# Patient Record
Sex: FEMALE | Race: WHITE
[De-identification: names, ages, dates, MRNs, and addresses within clinical notes are randomized per-mention and may not be internally consistent; named-entity substitution may affect disease eponyms.]

---

## 2017-01-12 ENCOUNTER — HOSPITAL ENCOUNTER (EMERGENCY)
Dept: HOSPITAL 75 - ER | Age: 57
Discharge: HOME | End: 2017-01-12
Payer: COMMERCIAL

## 2017-01-12 VITALS — WEIGHT: 140 LBS | HEIGHT: 65 IN | BODY MASS INDEX: 23.32 KG/M2

## 2017-01-12 VITALS — SYSTOLIC BLOOD PRESSURE: 113 MMHG | DIASTOLIC BLOOD PRESSURE: 63 MMHG

## 2017-01-12 DIAGNOSIS — E10.649: Primary | ICD-10-CM

## 2017-01-12 DIAGNOSIS — F17.210: ICD-10-CM

## 2017-01-12 LAB
ALBUMIN SERPL-MCNC: 4.6 G/DL (ref 3.2–4.5)
ALT SERPL-CCNC: 24 U/L (ref 0–55)
ANION GAP SERPL CALC-SCNC: 14 MMOL/L (ref 5–14)
APTT BLD: 27 SEC (ref 24–35)
AST SERPL-CCNC: 24 U/L (ref 5–34)
BASOPHILS # BLD AUTO: 0 10^3/UL (ref 0–0.1)
BASOPHILS NFR BLD AUTO: 1 % (ref 0–10)
BILIRUB SERPL-MCNC: 0.8 MG/DL (ref 0.1–1)
BILIRUB UR QL STRIP: NEGATIVE
BUN SERPL-MCNC: 7 MG/DL (ref 7–18)
BUN/CREAT SERPL: 10
CALCIUM SERPL-MCNC: 9.5 MG/DL (ref 8.5–10.1)
CHLORIDE SERPL-SCNC: 104 MMOL/L (ref 98–107)
CO2 SERPL-SCNC: 22 MMOL/L (ref 21–32)
CREAT SERPL-MCNC: 0.67 MG/DL (ref 0.6–1.3)
EOSINOPHIL # BLD AUTO: 0.1 10^3/UL (ref 0–0.3)
EOSINOPHIL NFR BLD AUTO: 2 % (ref 0–10)
ERYTHROCYTE [DISTWIDTH] IN BLOOD BY AUTOMATED COUNT: 13.3 % (ref 10–14.5)
GFR SERPLBLD BASED ON 1.73 SQ M-ARVRAT: > 60 ML/MIN
GLUCOSE SERPL-MCNC: 69 MG/DL (ref 70–105)
INR PPP: 1 (ref 0.8–1.4)
KETONES UR QL STRIP: (no result)
LEUKOCYTE ESTERASE UR QL STRIP: NEGATIVE
LYMPHOCYTES # BLD AUTO: 1.3 X 10^3 (ref 1–4)
LYMPHOCYTES NFR BLD AUTO: 27 % (ref 12–44)
MCH RBC QN AUTO: 29 PG (ref 25–34)
MCHC RBC AUTO-ENTMCNC: 34 G/DL (ref 32–36)
MCV RBC AUTO: 84 FL (ref 80–99)
MONOCYTES # BLD AUTO: 0.2 X 10^3 (ref 0–1)
MONOCYTES NFR BLD AUTO: 5 % (ref 0–12)
NEUTROPHILS # BLD AUTO: 3.2 X 10^3 (ref 1.8–7.8)
NEUTROPHILS NFR BLD AUTO: 66 % (ref 42–75)
NITRITE UR QL STRIP: NEGATIVE
PH UR STRIP: 6 [PH] (ref 5–9)
PLATELET # BLD: 237 10^3/UL (ref 130–400)
PMV BLD AUTO: 10 FL (ref 7.4–10.4)
POTASSIUM SERPL-SCNC: 3.4 MMOL/L (ref 3.6–5)
PROT SERPL-MCNC: 6.9 G/DL (ref 6.4–8.2)
PROT UR QL STRIP: NEGATIVE
PROTHROMBIN TIME: 12.9 SEC (ref 12.2–14.7)
RBC # BLD AUTO: 4.67 10^6/UL (ref 4.35–5.85)
SODIUM SERPL-SCNC: 140 MMOL/L (ref 135–145)
SP GR UR STRIP: 1.01 (ref 1.02–1.02)
SQUAMOUS #/AREA URNS HPF: (no result) /HPF
TROPONIN I SERPL-MCNC: < 0.3 NG/ML (ref ?–0.3)
UROBILINOGEN UR-MCNC: NORMAL MG/DL
WBC # BLD AUTO: 4.9 10^3/UL (ref 4.3–11)
WBC #/AREA URNS HPF: (no result) /HPF

## 2017-01-12 PROCEDURE — 70450 CT HEAD/BRAIN W/O DYE: CPT

## 2017-01-12 PROCEDURE — 36415 COLL VENOUS BLD VENIPUNCTURE: CPT

## 2017-01-12 PROCEDURE — 84484 ASSAY OF TROPONIN QUANT: CPT

## 2017-01-12 PROCEDURE — 96361 HYDRATE IV INFUSION ADD-ON: CPT

## 2017-01-12 PROCEDURE — 85379 FIBRIN DEGRADATION QUANT: CPT

## 2017-01-12 PROCEDURE — 80053 COMPREHEN METABOLIC PANEL: CPT

## 2017-01-12 PROCEDURE — 81000 URINALYSIS NONAUTO W/SCOPE: CPT

## 2017-01-12 PROCEDURE — 93005 ELECTROCARDIOGRAM TRACING: CPT

## 2017-01-12 PROCEDURE — 85730 THROMBOPLASTIN TIME PARTIAL: CPT

## 2017-01-12 PROCEDURE — 85025 COMPLETE CBC W/AUTO DIFF WBC: CPT

## 2017-01-12 PROCEDURE — 71010: CPT

## 2017-01-12 PROCEDURE — 96374 THER/PROPH/DIAG INJ IV PUSH: CPT

## 2017-01-12 PROCEDURE — 85610 PROTHROMBIN TIME: CPT

## 2017-01-12 PROCEDURE — 93041 RHYTHM ECG TRACING: CPT

## 2017-01-12 PROCEDURE — 82962 GLUCOSE BLOOD TEST: CPT

## 2017-01-12 NOTE — DIAGNOSTIC IMAGING REPORT
EXAM: CT head.



TECHNIQUE: Noncontrast axial images of the brain were obtained.



INDICATION: Confusion .



FINDINGS: There is no intracranial hemorrhage, edema or mass

effect. The brain parenchyma appears unremarkable. No

hydrocephalus.



The visualized portions of the orbits and paranasal sinuses

appear unremarkable.



IMPRESSION: Unremarkable study.



Dictated by:



Dictated on workstation # [id number]

## 2017-01-12 NOTE — DIAGNOSTIC IMAGING REPORT
Portable upright radiograph of the chest.



INDICATION: Confusion.



FINDINGS:

There is a stable calcified granuloma in the right lung base

since 2008 radiograph. The lungs otherwise appear clear. The

heart size is normal. No effusion or pneumothorax.



The mediastinum and golden appear unremarkable.



IMPRESSION: Unremarkable exam.



Dictated by:



Dictated on workstation # [id number]

## 2017-01-12 NOTE — ED NEUROLOGICAL PROBLEM
General


Chief Complaint:  Neuro-Stroke Like Symptoms


Stated Complaint:  POSS STROKE


Nursing Triage Note:  


Present to ED window accompanied by son, pt states she feels like she is having 


a stroke.  Awoke 1 hr prior confused and couldn't focus on surroundings, went 

to 


bed [id number].  Needed to urinate and was unable to walk and became incont.  


Went to get in shower and unable to get normal functioning. Pt now reports she 


is on a 30 day challenge diet cleansing system.  Pt is diabetic


Nursing Sepsis Screen:  No Definite Risk





History of Present Illness


Time seen by provider:  03:51


Initial Comments


Here with report of awakening about 1 hour ago confused and unable to move 

well.  She states she had to get up to go the bathroom.  She states that she 

fell and ultimately was incontinent of urine.  She showered somewhat because of 

the incontinence but was remaining confused and was able to get her son to 

bring her here to the ER.  Patient is a diabetic but was unable to check her 

blood sugar because she was unable to perform the task due to problems with 

dexterity.  States that she is a little better now.  She admits to recent 

dietary changes do to a 30 day challenge diet that she started this week.  

Reports that she took a sleeping pill last night that is an over-the-counter 

product sleep aid.  Denies fever or chills.  No nausea or vomiting.  Last known 

well time 1030 p.m. last night.


Timing/Duration:  1 hour, decreasing


Severity:  moderate


Associated Symptoms:   confusionNo fever/chills, No nausea/vomiting, No 

numbness in legs/feet, No paresthesia,  sleepy slurred speechNo tingling in legs

/feet,  trouble walking weakness





Allergies and Home Medications


Allergies


Coded Allergies:  


     No Known Allergies (Verified  Allergy, Unknown, 08)





Home Medications


Doxylamine Succinate 25 Mg Tablet 25 MG PO HS (Reported) 


Insulin Aspart 100 Unit/1 Ml Susp 0 SQ TIDAC (Reported) 


   sliding scale 


Insulin Detemir 100 Unit/1 Ml Insuln.pen 12 U SQ BID (Reported) 


Pravastatin Sodium 40 Mg Tablet 40 MG PO HS (Reported) 





Constitutional:   see HPINo chills, No fever


Eyes:   See HPI


Ears, Nose, Mouth, Throat:   no symptoms reported


Respiratory:   no symptoms reportedNo cough, No short of breath


Cardiovascular:   no symptoms reportedNo chest pain, No palpitations


Gastrointestinal:   no symptoms reportedNo abdominal pain, No nausea, No 

vomiting


Genitourinary:   see HPI


Musculoskeletal:   see HPI


Psychiatric/Neurological:   See HPI


All Other Systems Reviewed


Negative Unless Noted:  Yes





Past Medical-Social-Family Hx


Patient Social History


Alcohol Use:  Occasionally Uses


Recreational Drug Use:  No


Smoking Status:  Former Smoker


Type Used:  Cigarettes


Recent Foreign Travel:  No


Contact w/Someone Who Travel:  No


Recent Infectious Disease Expo:  No


Recent Hopitalizations:  No


Physical Abuse Screen:  No


Sexual Abuse:  No





Immunizations Up To Date


Date of Pneumonia Vaccine:  Oct 3, 2011





Seasonal Allergies


Seasonal Allergies:  Yes





Surgeries


HX Surgeries:  Yes (, TUBOPLASTY, HERNIA REPAIR , LAP APP)


Surgeries:  Appendectomy





Respiratory


Hx Respiratory Disorders:  No





Cardiovascular


Hx Cardiac Disorders:  No





Neurological


Hx Neurological Disorders:  No





Reproductive System


Hx Reproductive Disorders:  Yes





Genitourinary


Hx Genitourinary Disorders:  No





Gastrointestinal


Hx Gastrointestinal Disorders:  Yes (LAP APPY 6/6/10,  HERNIA REPAIR )





Musculoskeletal


Hx Musculoskeletal Disorders:  No





Endocrine


Hx Endocrine Disorders:  Yes (DM type 1)


Endocrine Disorders:  Diabetes, Insulin dep





HEENT


HX ENT Disorders:  No





Cancer


Hx Cancer:  No





Psychosocial


Hx Psychiatric Problems:  No





Integumentary


HX Skin/Integumentary Disorder:  No





Blood Transfusions


Hx Blood Disorders:  No





Reviewed Nursing Assessment


Reviewed/Agree w Nursing PMH:  Yes





Physical Exam


Vital Signs





 Vital Sign - Last 12Hours








 17





 03:51


 


Temp 96.4


 


Pulse 95


 


Resp 20


 


B/P 150/81


 


Pulse Ox 100


 


O2 Delivery Room Air





Capillary Refill : Less Than 3 Seconds


General Appearance:   WD/WN no apparent distress


HEENT:   PERRL/EOMI TMs normal pharynx normal


Neck:   full range of motion supple


Respiratory:   lungs clear normal breath sounds no respiratory distress


Cardiovascular:   regular rate, rhythm no murmur


Peripheral Pulses:  2+ Dorsalis Pedis (R), 2+ Left Dors-Pedis (L), 2+ Radial 

Pulses (R), 2+ Radial Pulses (L)


Gastrointestinal:   non tender soft


Back:   normal inspection no CVA tenderness no vertebral tenderness


Extremities:   normal range of motion non-tender normal inspection


Neurologic/Psychiatric:   CNs II-XII nml as tested no motor/sensory deficits 

alert oriented x 3


Crainal Nerves:   normal hearing normal speech PERRL


Coordination/Gait:   normal finger to nose normal gait


Motor/Sensory:   no motor deficit no sensory deficit no pronator drift


Skin:   normal color warm/dry





Stroke


NIH Stroke Scale Assessment





   Level of Consciousness:  0=Alert


   Level of Consciousness-Questio:  0=Answers both month/age


   LOC Commands:  0=Performs both tasks


   Gaze:  0=Normal


   Visual Fields:  0=No visual loss


   Facial Movement (Facial Paresi:  0=Normal symmetrical mnt


   Motor Function-Arms Right:  0=No drift


   Motor Function-Arms Left:  0=No drift


   Motor Function-Legs Right:  0=No drift


   Motor Function-Legs Left:  0=No drift


   Limb Ataxia:  0=Absent


   Sensory:  0=Normal:no loss


   Best Language:  0=No aphasia


   Dysarthria:  0=Normal


   Extinction & Inattention:  0=No abnormality





Progress/Results/Core Measures


Results/Orders


Lab Results





 Laboratory Tests








Test


  17


03:57 17


04:04 17


05:07 Range/Units


 


 


Glucometer 61 L     MG/DL


 


Activated Partial


Thromboplast Time 


  27 


  


  24-35  SEC


 


 


Alanine Aminotransferase


(ALT/SGPT) 


  24 


  


  0-55  U/L


 


 


Albumin  4.6 H  3.2-4.5  G/DL


 


Alkaline Phosphatase  61     U/L


 


Anion Gap  14   5-14  MMOL/L


 


Aspartate Amino Transf


(AST/SGOT) 


  24 


  


  5-34  U/L


 


 


BUN/Creatinine Ratio  10    


 


Basophils # (Auto)


  


  0.0 


  


  0.0-0.1


10^3/uL


 


Basophils (%) (Auto)  1   0-10  %


 


Blood Urea Nitrogen  7   7-18  MG/DL


 


Calcium Level  9.5   8.5-10.1  MG/DL


 


Carbon Dioxide Level  22   21-32  MMOL/L


 


Chloride Level  104     MMOL/L


 


Creatinine


  


  0.67 


  


  0.60-1.30


MG/DL


 


D-Dimer


  


  0.27 


  


  0.00-0.49


UG/ML


 


Eosinophils # (Auto)


  


  0.1 


  


  0.0-0.3


10^3/uL


 


Eosinophils (%) (Auto)  2   0-10  %


 


Estimat Glomerular Filtration


Rate 


  > 60 


  


   


 


 


Glucose Level  69 L    MG/DL


 


Hematocrit  39   35-52  %


 


Hemoglobin  13.3   11.5-16.0  G/DL


 


INR Comment  1.0   0.8-1.4  


 


Lymphocytes # (Auto)  1.3   1.0-4.0  X 10^3


 


Lymphocytes (%) (Auto)  27   12-44  %


 


Mean Corpuscular Hemoglobin  29   25-34  PG


 


Mean Corpuscular Hemoglobin


Concent 


  34 


  


  32-36  G/DL


 


 


Mean Corpuscular Volume  84   80-99  FL


 


Mean Platelet Volume  10.0   7.4-10.4  FL


 


Monocytes # (Auto)  0.2   0.0-1.0  X 10^3


 


Monocytes (%) (Auto)  5   0-12  %


 


Neutrophils # (Auto)  3.2   1.8-7.8  X 10^3


 


Neutrophils (%) (Auto)  66   42-75  %


 


Platelet Count


  


  237 


  


  130-400


10^3/uL


 


Potassium Level  3.4 L  3.6-5.0  MMOL/L


 


Prothrombin Time  12.9   12.2-14.7  SEC


 


Red Blood Count


  


  4.67 


  


  4.35-5.85


10^6/uL


 


Red Cell Distribution Width  13.3   10.0-14.5  %


 


Sodium Level  140   135-145  MMOL/L


 


Total Bilirubin  0.8   0.1-1.0  MG/DL


 


Total Protein  6.9   6.4-8.2  G/DL


 


Troponin I  < 0.30   <0.30  NG/ML


 


White Blood Count


  


  4.9 


  


  4.3-11.0


10^3/uL


 


Urine Bacteria   NEGATIVE   /HPF


 


Urine Bilirubin   NEGATIVE  NEGATIVE  


 


Urine Casts   NONE   /LPF


 


Urine Clarity   CLEAR   


 


Urine Color   YELLOW   


 


Urine Crystals   NONE   /LPF


 


Urine Culture Indicated   NO   


 


Urine Glucose (UA)   3+ H NEGATIVE  


 


Urine Ketones   3+ H NEGATIVE  


 


Urine Leukocyte Esterase   NEGATIVE  NEGATIVE  


 


Urine Mucus   NEGATIVE   /LPF


 


Urine Nitrite   NEGATIVE  NEGATIVE  


 


Urine Protein   NEGATIVE  NEGATIVE  


 


Urine RBC   NONE   /HPF


 


Urine RBC (Auto)   NEGATIVE  NEGATIVE  


 


Urine Specific Gravity   1.010 L 1.016-1.022  


 


Urine Squamous Epithelial


Cells 


  


  5-10 


   /HPF


 


 


Urine Urobilinogen   NORMAL  NORMAL  MG/DL


 


Urine WBC   NONE   /HPF


 


Urine pH   6  5-9  








My Orders





 Orders-GABI COSTA MD


Cbc With Automated Diff (17 04:03)


Protime With Inr (17 04:03)


Partial Thromboplastin Time (17 04:03)


Comprehensive Metabolic Panel (17 04:03)


Fibrin Degradation Products (17 04:03)


Troponin I (17 04:03)


Ua Culture If Indicated (17 04:03)


Chest 1 View, Ap/Pa Only (17 04:03)


Ekg Tracing (17 04:03)


Nothing By Mouth (17 Breakfast)


Accucheck Stat ONCE (17 04:03)


Saline Lock/Iv-Start (17 04:03)


Vital Signs-Stroke Q1H (17 04:03)


Ct Head Wo-R/O Stroke (17 04:03)


O2 (17 04:03)


Intake & Output 06,14,22 (17 04:03)


Monitor-Rhythm Ecg Trace Only (17 04:03)


Dysphagia Screening Tool (17 04:03)


D50w (Emergency) Syringe (Dextrose 50% 5 (17 03:58)


D50w (Emergency) Syringe (Dextrose 50% 5 (17 04:15)


Ns Iv 1000 Ml (Sodium Chloride 0.9%) (17 05:24)





Medications Given in ED





 Current Medications








 Medications  Dose


 Ordered  Sig/Ira


 Route  Start Time


 Stop Time Status Last Admin


Dose Admin


 


 Dextrose 50 ml  50 ml  STK-MED ONCE


 .ROUTE  17 03:58


 17 04:06 DC 17 04:06


50 ML


 


 Sodium Chloride  1,000 ml @ 


 0 mls/hr  Q0M ONCE


 IV  17 05:24


 17 05:25 DC 17 05:30


999 MLS/HR








Vital Signs/I&O





 Vital Sign - Last 12Hours








 17





 03:51


 


Temp 96.4


 


Pulse 95


 


Resp 20


 


B/P 150/81


 


Pulse Ox 100


 


O2 Delivery Room Air








Blood Pressure Mean:  104





Point of Care Testing


Finger Stick Blood Glucose:  61


Blood Glucose Action Taken:  reported to Dr Roper Note :  


Progress Note


Seen and evaluated on arrival.  IV, labs, EKG and chest x-ray ordered.  CT head 

ordered.  Stroke protocol initiated.  Patient has stroke scale of 0 as 

performed by me and nursing.  Blood sugar noted to be 61.  D50 1 amp IV 

ordered.  No indication for TPA as patient is outside of window and stroke 

scale is 0.  Likely glucose related but will monitor.  0500: CT is negative.  

Patient able to walk to the bathroom without difficulty.  Monitor patient.  0540

: UA shows ketones.  Patient has been on a diet.  She does report that she has 

been drinking plenty of fluids.  We will give 1 L of normal saline and have her 

eat something here.  Dysphasia screen was passed.  Patient has resolved back to 

normal now.  After fluids and food she be discharged home.  Discharged home 

with return precautions.  Patient verbalize understanding instructions and 

agreement with plan.





ECG


Initial ECG Impression Date:  2017


Initial ECG Impression Time:  03:58


Initial ECG Rate:  78


Initial ECG Rhythm:  Normal Sinus


Comment


Sinus rhythm with incomplete bundle branch block.  No evidence of ST elevation 

MI.  Similar to previous of 2010.  Interpreted by me.





Diagnostic Imaging





   Diagonstic Imaging:  CT


   Plain Films/CT/US/NM/MRI:  head


Comments


No evidence of acute intracranial hemorrhage or mass effect.  No midline shift.


   Reviewed:  Reviewed Night Brockk Study, Reviewed by Me








   Diagonstic Imaging:  Xray


   Plain Films/CT/US/NM/MRI:  chest


Comments


No acute findings.


   Reviewed:  Reviewed by Me





Departure


Impression


Impression:  


 Primary Impression:  


 Hypoglycemia


Disposition:  01 HOME, SELF-CARE


Condition:  Improved





Departure-Patient Inst.


Decision time for Depature:  05:16


Referrals:  


PEYTON ALCALA DO (PCP)


Primary Care Physician








ABRIL FRANKS (Family)


Primary Care Physician


Patient Instructions:  HYPOGLYCEMIA





Add. Discharge Instructions:


All discharge instructions reviewed with patient and/or family. Voiced 

understanding.





Continue home medications as directed.  Balance your diet and insulin needs to 

prevent persistent low blood sugar.  You may talk with the diabetes education 

coordinator at the clinic to assist with your diet.  You may benefit from a 

snack at nighttime before bed to prevent low blood sugar in the middle of the 

night.  Return for worse pain, fever, vomiting, weakness, breathing problems or 

other concerns as needed.





Copy


Copies To 1:   PEYTON ALCALA TIMOTHY D MD 2017 05:11

## 2017-01-12 NOTE — XMS REPORT
Continuity of Care Document

 Created on: 2016



KAYLEEN ALCALA

External Reference #: [id number]

: 1960

Sex: Female



Demographics







 Address  [address] [place]  [address]

 

 Home Phone  [phone]

 

 Preferred Language  English

 

 Marital Status  Unknown

 

 [employer] Affiliation  Unknown

 

 Race  Unknown

 

 Ethnic Group  Unknown





Author







 Author  [facility]

 

 Organization  [facility]

 

 Address  Unknown

 

 Phone  Unavailable







Support







 Name  Relationship  Address  Phone

 

 CARI PEYTON DUKE DO  Caregiver  [address], [place]  [phone]

 

 FINESSE AUGUSTIN DO  Caregiver  [address], [place]  [phone]

 

 RAULMACK ABRIL L ARNP  Caregiver  [address] [place]  [address] [phone]

 

 RICHARD ALCALA  Next Of Kin  [address], [place]  [phone]







Care Team Providers







 Care Team Member Name  Role  Phone

 

 PEYTON ALCALA DO  PCP  [phone]







Insurance Providers







 Payer Name  Policy Number  Subscriber Name  Relationship

 

 [facility]  [id number]  Kayleen Alcala  18 Self / Same As Patient







Advance Directives







 Directive  Response  Recorded Date/Time

 

 Advance Directives  No  16 8:48am

 

 Health Care Power of   No  16 8:48am

 

 Organ Donor  Yes  16 8:48am

 

 Resuscitation Status  Full Code  16 8:48am







Problems

No problem information available.



Medications

Current Home Medications







 Medication  Dose  Units  Route  Directions  Days/Qty  Instructions  Start Date

 

 Insulin Detemir 100 Unit/1 Ml  12  U  Sub-Q  Twice A Day        06/06/10

 

 Insulin Aspart 100 Unit/1 Ml  0     Sub-Q  Three Times A Day Before Meals     
sliding scale  16

 

 Pravastatin Sodium 40 Mg  40  Mg  Oral  Bedtime        16

 

 Doxylamine Succinate 25 Mg  25  Mg  Oral  Bedtime        16





Past Home Medications







 Medication  Directions  Ordered  Status

 

 [Novalog] ,     06/06/10  Discontinued

 

 Duloxetine Hcl 30 Mg Capsule.dr, 30 Mg Oral  Bedtime  06/06/10  Discontinued

 

 Insulin Detemir 100 U/Ml Insuln.pen, 20 Units Sub-Q  Pm  06/07/10  Discontinued







Social History







 Social History Problem  Response  Recorded Date/Time

 

 Recent Foreign Travel  No  2016 8:52am

 

 Recent Infectious Disease Exposure  No  2016 8:52am

 

 Smoking Status  Former Smoker  2016 8:50am

 

 Type Used  Cigarettes  2016 11:33am









 Query  Response  Start Date  Stop Date

 

 Smoking Status  Former Smoker      







Hospital Discharge Instructions

 

Patient Instructions

Physician Instructions

 

Plan of Care/Instructions/FU:  

It is recommend the patient has colonoscopy in 10 years unless changes in

bowel habits, recent diagnosis of colon cancer in family or changes in

current condition.

Activity as Tolerated:  Yes

Discharge Diet:  No Restrictions

 

 

Care Plan

Patient Instructions:: It is recommend the patient has colonoscopy in 10 years 

unless changes inbowel habits, recent diagnosis of colon cancer in family or 

changes incurrent condition.

 



Plan of Care







 Discharge Date  16 11:28am

 

 Instructions/Education Provided  COLONOSCOPY

 

 Prescriptions  See Medication Section







Functional Status

No functional status results.



Allergies, Adverse Reactions, Alerts







 Allergen  Type  Severity  Reaction  Status  Last Updated

 

 No Known Allergies  Allergy  Unknown     Active  08







Immunizations

No immunization records.



Vital Signs

Acute Vital Signs





 Vital  Response  Date/Time

 

 Temperature (Fahrenheit)  97.5 degrees F (97.6 - 99.5)  2016 11:28am

 

 Temperature (Calculated Celsius)  36.15570 degrees C (36.4 - 37.5)  2016 
11:28am

 

 Temperature Source  Tympanic  2016 11:28am

 

 Pulse Rate (adult)  59 bpm (60 - 90)  2016 11:28am

 

 Respiratory Rate  18 bpm (12 - 24)  2016 11:28am

 

 O2 Sat by Pulse Oximetry  100 % (88 - 100)  2016 11:28am

 

 Blood Pressure  103/62 mm Hg  2016 11:28am

 

 Pain      

 

    Numeric Pain Scale  0-No Pain  2016 11:28am

 

    Pain Intensity  0  2016 11:15am

 

 Height (Feet)  5 feet  2016 8:52am

 

 Height (Inches)  5.00 inches  2016 8:52am

 

 Height (Calculated Centimeters)  165.015478 cm  2016 8:52am

 

 Weight (Pounds)  140 pounds  2016 8:52am

 

 Weight (Ounces)  0.0 oz  2016 8:52am

 

 Weight (Calculated Grams)  31083.932 gm  2016 8:52am

 

 Weight (Calculated Kilograms)  63.970373 kilograms  2016 8:52am

 

 Calculated BMI  23.3  2016 8:52am







Results

Laboratory Results







 Test Name  Result  Units  Flags  Reference  Collection Date/Time  Result Date/
Time  Comments

 

 Glucometer  371  MG/DL  H    2016 8:58am  2016 9:05am   







Procedures







 Procedure  Status  Date  Provider(s)

 

 Anesthesia for 30 minutes  Active  16  FINESSE AUGUSTIN DO

 

 Diagnostic colonoscopy  Completed  16  FINESES AUGUSTIN DO







Encounters







 Encounter  Location  Arrival/Admit Date  Discharge/Depart Date  Attending 
Provider

 

 Departed Surgical Day Care  [facility]  16 8:31am   11:28am  FINESSE AUGUSTIN DO

 

 Departed Clinic  [facility]  16 5:39am  16 2:
49pm  FINESSE AUGUSTIN DO

## 2017-08-28 ENCOUNTER — HOSPITAL ENCOUNTER (OUTPATIENT)
Dept: HOSPITAL 75 - RAD | Age: 57
End: 2017-08-28
Attending: NURSE PRACTITIONER
Payer: COMMERCIAL

## 2017-08-28 DIAGNOSIS — Z12.31: Primary | ICD-10-CM

## 2017-08-28 PROCEDURE — 77067 SCR MAMMO BI INCL CAD: CPT

## 2019-10-15 ENCOUNTER — HOSPITAL ENCOUNTER (OUTPATIENT)
Dept: HOSPITAL 75 - RAD | Age: 59
End: 2019-10-15
Attending: NURSE PRACTITIONER
Payer: COMMERCIAL

## 2019-10-15 DIAGNOSIS — Z12.31: Primary | ICD-10-CM

## 2019-10-15 PROCEDURE — 77067 SCR MAMMO BI INCL CAD: CPT

## 2019-10-15 NOTE — DIAGNOSTIC IMAGING REPORT
INDICATION: 

Routine screening.



COMPARISON:     

08/28/2017 and 02/24/2016.



TECHNIQUE: 

2D and 3D bilateral screening mammography was performed with CAD.



FINDINGS:

Scattered fibroglandular densities are identified bilaterally. A

benign nodule in the right breast appears stable. No new mass or

malignant appearing microcalcifications are seen. The axillae are

unremarkable.



IMPRESSION: 

No mammographic features suspicious for malignancy are

identified.



ACR BI-RADS Category 2: Benign findings.

Result letter will be mailed to the patient.

Note: At least 10% of breast cancer is not imaged by mammography.



Dictated by: 



  Dictated on workstation # [id number]

## 2022-05-26 ENCOUNTER — HOSPITAL ENCOUNTER (OUTPATIENT)
Dept: HOSPITAL 75 - PREOP | Age: 62
LOS: 7 days | Discharge: HOME | End: 2022-06-02
Attending: SPECIALIST
Payer: COMMERCIAL

## 2022-05-26 VITALS — BODY MASS INDEX: 23.88 KG/M2 | HEIGHT: 65 IN | WEIGHT: 143.3 LBS

## 2022-05-26 DIAGNOSIS — Z01.818: Primary | ICD-10-CM

## 2022-06-03 ENCOUNTER — HOSPITAL ENCOUNTER (OUTPATIENT)
Dept: HOSPITAL 75 - SDC | Age: 62
Discharge: HOME | End: 2022-06-03
Attending: SPECIALIST
Payer: COMMERCIAL

## 2022-06-03 VITALS — WEIGHT: 143.3 LBS | HEIGHT: 65 IN | BODY MASS INDEX: 23.88 KG/M2

## 2022-06-03 VITALS — DIASTOLIC BLOOD PRESSURE: 73 MMHG | SYSTOLIC BLOOD PRESSURE: 137 MMHG

## 2022-06-03 VITALS — SYSTOLIC BLOOD PRESSURE: 130 MMHG | DIASTOLIC BLOOD PRESSURE: 73 MMHG

## 2022-06-03 DIAGNOSIS — H25.9: ICD-10-CM

## 2022-06-03 DIAGNOSIS — E11.36: Primary | ICD-10-CM

## 2022-06-03 DIAGNOSIS — Z79.4: ICD-10-CM

## 2022-06-03 PROCEDURE — 66984 XCAPSL CTRC RMVL W/O ECP: CPT

## 2022-06-03 RX ADMIN — PHENYLEPHRINE HYDROCHLORIDE SCH ML: 100 SOLUTION/ DROPS OPHTHALMIC at 07:36

## 2022-06-03 RX ADMIN — TETRACAINE HYDROCHLORIDE PRN ML: 5 SOLUTION OPHTHALMIC at 07:26

## 2022-06-03 RX ADMIN — TROPICAMIDE SCH ML: 10 SOLUTION/ DROPS OPHTHALMIC at 07:20

## 2022-06-03 RX ADMIN — TETRACAINE HYDROCHLORIDE PRN ML: 5 SOLUTION OPHTHALMIC at 07:10

## 2022-06-03 RX ADMIN — TROPICAMIDE SCH ML: 10 SOLUTION/ DROPS OPHTHALMIC at 07:26

## 2022-06-03 RX ADMIN — TROPICAMIDE SCH ML: 10 SOLUTION/ DROPS OPHTHALMIC at 07:36

## 2022-06-03 RX ADMIN — TETRACAINE HYDROCHLORIDE PRN ML: 5 SOLUTION OPHTHALMIC at 07:36

## 2022-06-03 RX ADMIN — PHENYLEPHRINE HYDROCHLORIDE SCH ML: 100 SOLUTION/ DROPS OPHTHALMIC at 07:20

## 2022-06-03 RX ADMIN — PHENYLEPHRINE HYDROCHLORIDE SCH ML: 100 SOLUTION/ DROPS OPHTHALMIC at 07:26

## 2022-06-03 RX ADMIN — TETRACAINE HYDROCHLORIDE PRN ML: 5 SOLUTION OPHTHALMIC at 07:20

## 2022-06-03 NOTE — OPHTHALMOLOGY OPERATIVE REPORT
Cataract removal/placement IOL


PREOPERATIVE DIAGNOSIS: Cataract Right Eye


POSTOPERATIVE DIAGNOSIS: Cataract Right Eye





PROCEDURE: Cataract removal and placement of posterior chamber implant, right 

eye





SURGEON: Jose Luis Vasquez 





ANESTHESIA: Topical with sedation





COMPLICATIONS: None





ESTIMATED BLOOD LOSS: Minimal 





DESCRIPTION OF PROCEDURE:


After proper informed consent was obtained, the patient, a 62 female, was taken 

to the Operating Room and the right eye was anesthetized with tetracaine.  The 

right eye was then prepped and draped in the usual manner.  A wire lid speculum 

was placed. A paracentesis was made at the left hand position. Preservative free

lidocaine was injected into the anterior chamber followed by viscoelastic.  A 

clear corneal incision was made in the temporal position. A capsulorrhexis was 

preformed and the central nuclear and cortical material were removed.  The 

posterior capsule was polished and Lefty 24.5 AU00T0  IOL was placed into the 

capsular bag. The residual viscoelastic was aspirated and balanced saline 

solution was injected into the anterior chamber. Moxifloxacin  was injected into

the anterior chamber.





The wound was checked and found to be water tight.





The patient tolerated the procedure well without complications.











JOSE LUIS VASQUEZ MD              Kulwant 3, 2022 09:14

## 2022-06-03 NOTE — OPHTHALMOLOGIST PRE-OP NOTE
Pre-Operative Progress Note


H&P Reviewed


The H&P was reviewed, patient examined and no changes noted.


Date H&P Reviewed:  Kulwant 3, 2022


Time H&P Reviewed:  08:52


Pre-Op Dx


Cataract, Right Eye











NAVARRO VASQUEZ MD              Kulwant 3, 2022 08:53
Lung mass

## 2022-06-13 NOTE — ANESTHESIA-GENERAL POST-OP
MAC


Patient Condition


Mental Status/LOC:  Same as Preop


Cardiovascular:  Satisfactory


Nausea/Vomiting:  Absent


Respiratory:  Satisfactory


Pain:  Controlled


Complications:  Absent





Post Op Complications


Complications


None





Follow Up Care/Instructions


Patient Instructions


None needed.





Anesthesiology Discharge Order


Discharge Order


Patient is doing well, no complaints, stable vital signs, no apparent adverse 

anesthesia problems.   


No complications reported per nursing.











MÓNICA LUNA CRNA         Jun 13, 2022 07:16

## 2022-06-17 ENCOUNTER — HOSPITAL ENCOUNTER (OUTPATIENT)
Dept: HOSPITAL 75 - SDC | Age: 62
Discharge: HOME | End: 2022-06-17
Attending: SPECIALIST
Payer: COMMERCIAL

## 2022-06-17 VITALS — DIASTOLIC BLOOD PRESSURE: 69 MMHG | SYSTOLIC BLOOD PRESSURE: 125 MMHG

## 2022-06-17 VITALS — BODY MASS INDEX: 23.88 KG/M2 | WEIGHT: 143.3 LBS | HEIGHT: 64.96 IN

## 2022-06-17 VITALS — DIASTOLIC BLOOD PRESSURE: 74 MMHG | SYSTOLIC BLOOD PRESSURE: 120 MMHG

## 2022-06-17 DIAGNOSIS — Z79.4: ICD-10-CM

## 2022-06-17 DIAGNOSIS — H25.9: ICD-10-CM

## 2022-06-17 DIAGNOSIS — E11.36: Primary | ICD-10-CM

## 2022-06-17 PROCEDURE — 66984 XCAPSL CTRC RMVL W/O ECP: CPT

## 2022-06-17 RX ADMIN — TETRACAINE HYDROCHLORIDE PRN ML: 5 SOLUTION OPHTHALMIC at 07:06

## 2022-06-17 RX ADMIN — TROPICAMIDE SCH ML: 10 SOLUTION/ DROPS OPHTHALMIC at 06:58

## 2022-06-17 RX ADMIN — TROPICAMIDE SCH ML: 10 SOLUTION/ DROPS OPHTHALMIC at 07:15

## 2022-06-17 RX ADMIN — TETRACAINE HYDROCHLORIDE PRN ML: 5 SOLUTION OPHTHALMIC at 06:58

## 2022-06-17 RX ADMIN — PHENYLEPHRINE HYDROCHLORIDE SCH ML: 100 SOLUTION/ DROPS OPHTHALMIC at 06:58

## 2022-06-17 RX ADMIN — TETRACAINE HYDROCHLORIDE PRN ML: 5 SOLUTION OPHTHALMIC at 06:50

## 2022-06-17 RX ADMIN — PHENYLEPHRINE HYDROCHLORIDE SCH ML: 100 SOLUTION/ DROPS OPHTHALMIC at 07:15

## 2022-06-17 RX ADMIN — PHENYLEPHRINE HYDROCHLORIDE SCH ML: 100 SOLUTION/ DROPS OPHTHALMIC at 07:06

## 2022-06-17 RX ADMIN — TROPICAMIDE SCH ML: 10 SOLUTION/ DROPS OPHTHALMIC at 07:06

## 2022-06-17 RX ADMIN — TETRACAINE HYDROCHLORIDE PRN ML: 5 SOLUTION OPHTHALMIC at 07:15

## 2022-06-17 NOTE — OPHTHALMOLOGIST PRE-OP NOTE
Pre-Operative Progress Note


H&P Reviewed


The H&P was reviewed, patient examined and no changes noted.


Date H&P Reviewed:  Jun 17, 2022


Time H&P Reviewed:  07:56


Pre-Op Dx


Cataract, Left Eye











NAVARRO VASQUEZ MD             Jun 17, 2022 07:56

## 2022-06-17 NOTE — ANESTHESIA-GENERAL POST-OP
MAC


Patient Condition


Mental Status/LOC:  Same as Preop


Cardiovascular:  Satisfactory


Nausea/Vomiting:  Absent


Respiratory:  Satisfactory


Pain:  Controlled


Complications:  Absent





Post Op Complications


Complications


None





Follow Up Care/Instructions


Patient Instructions


None needed.





Anesthesiology Discharge Order


Discharge Order


Patient is doing well, no complaints, stable vital signs, no apparent adverse 

anesthesia problems.   


No complications reported per nursing.











IKER GOODE CRNA            Jun 17, 2022 12:50

## 2022-06-17 NOTE — OPHTHALMOLOGY OPERATIVE REPORT
Cataract removal/placement IOL


PREOPERATIVE DIAGNOSIS:    Cataract Left Eye


POSTOPERATIVE DIAGNOSIS: Cataract Left Eye





PROCEDURE: Cataract removal and placement of posterior chamber implant, left eye





SURGEON: Jose Luis Vasquez 





ANESTHESIA: Topical with sedation





COMPLICATIONS: None





ESTIMATED BLOOD LOSS: Minimal 





DESCRIPTION OF PROCEDURE:


After proper informed consent was obtained, the patient, a 62 female, was taken 

to the Operating Room and the left eye was anesthetized with tetracaine.  The 

left eye was then prepped and draped in the usual manner.  A wire lid speculum 

was placed. A paracentesis was made at the left hand position. Preservative free

lidocaine was injected into the anterior chamber followed by viscoelastic.  A 

clear corneal incision was made in the temporal position. A capsulorrhexis was 

preformed and the central nuclear and cortical material were removed.  The 

posterior capsule was polished and an Lefty 23.5 AU00T0 was placed into the 

capsular bag. The residual viscoelastic was aspirated and balanced saline 

solution was injected into the anterior chamber.  Moxifloxacin was injected into

the anterior chamber.





The wound was checked and found to be water tight.





The patient tolerated the procedure well without complications.











JOSE LUIS VASQUEZ MD             Jun 17, 2022 08:17

## 2023-08-17 ENCOUNTER — HOSPITAL ENCOUNTER (EMERGENCY)
Dept: HOSPITAL 75 - ER | Age: 63
LOS: 1 days | Discharge: HOME | End: 2023-08-18
Payer: COMMERCIAL

## 2023-08-17 VITALS — HEIGHT: 64.96 IN | BODY MASS INDEX: 23.32 KG/M2 | WEIGHT: 139.99 LBS

## 2023-08-17 DIAGNOSIS — Z90.49: ICD-10-CM

## 2023-08-17 DIAGNOSIS — E11.9: ICD-10-CM

## 2023-08-17 DIAGNOSIS — Z28.310: ICD-10-CM

## 2023-08-17 DIAGNOSIS — K59.00: Primary | ICD-10-CM

## 2023-08-17 DIAGNOSIS — Z79.4: ICD-10-CM

## 2023-08-17 PROCEDURE — 74022 RADEX COMPL AQT ABD SERIES: CPT

## 2023-08-18 VITALS — DIASTOLIC BLOOD PRESSURE: 81 MMHG | SYSTOLIC BLOOD PRESSURE: 151 MMHG

## 2023-08-18 NOTE — DIAGNOSTIC IMAGING REPORT
INDICATION: abd pain



COMPARISON: 01/12/2017



FINDINGS: Supine and upright views of the abdomen show a

nondistended bowel gas pattern. No abnormal air fluid levels or

free intraperitoneal air is seen. Moderate air and stool is

present scattered throughout the colon. No abnormal extraosseous

calcifications are seen. Bony and soft tissue structures are

within normal limits. No organomegaly is identified.



Accompanying upright chest shows normal heart size and pulmonary

vascularity. The lungs are well aerated and clear. The

mediastinum is normal in appearance. 



IMPRESSION:   

1.  No bowel obstruction or free air.  

2.  Normal chest. No pneumonia or pulmonary edema.

3. Moderate colonic air and stool. Please correlate for

constipation.



I agree with preliminary Emergency Room interpretation.



Dictated by: 



  Dictated on workstation # [id number]

## 2023-08-18 NOTE — ED GI
General


Chief Complaint:  Abdominal/GI Problems


Stated Complaint:  CONSTIPATED


Nursing Triage Note:  


PATIENT REPORTS CONSTIPATION SINCE SATURDAY. PATIENT STATES SHE HAS ATTEMPTED 1 


SOAP SUDS ENEMA, APPROX 4 WARM WATER ENEMAS, 3 GLYCERIN SUPP WITH NO RESULTS. 


PATIENT STATES RECTAL PAIN NOW











PATIENT STATES RECENTLY STARTED LISINOPRIL, DID NOT TAKE TONIGHT DUE TO NAUSEA.


Source of Information:  Patient





Allergies and Home Medications


Allergies


Coded Allergies:  


     NKANo Known Allergies (Verified  Allergy, Unknown, 6/2/22)





Patient Home Medication List


Doxylamine Succinate (Sleep Aid) 25 Mg Tablet, 25 MG PO HS, (Reported)


   Entered as Reported by: RITCHIE BURGESS on 7/21/16 1448


Insulin Aspart (Novolog) 100 Unit/1 Ml Susp, 0 SQ TIDAC, (Reported)


   Entered as Reported by: RITCHIE BURGESS on 7/21/16 1448


Insulin Degludec (Tresiba) 100 Unit/Ml Vial, 8 UNIT SQ HS, (Reported)


   Entered as Reported by: KACIE GORDON on 6/2/22 0928


Pravastatin Sodium (Pravastatin Sodium) 40 Mg Tablet, 40 MG PO HS, (Reported)


   Entered as Reported by: RITCHIE BURGESS on 7/21/16 1448





Past Medical-Social-Family Hx


Seasonal Allergies


Seasonal Allergies:  Yes





Past Medical History


Surgery/Hospitalization HX:  


DIABETIC-INSULIN DEPENDENT; HTN


Appendectomy


Reproductive Disorders:  Yes


Diabetes, Insulin dep





Physical Exam


Vital Signs





Vital Signs - First Documented








 8/17/23





 23:50


 


Temp 37.0


 


Pulse 97


 


Resp 20


 


B/P (MAP) 172/95 (120)


 


O2 Delivery Room Air





Capillary Refill : Less Than 3 Seconds


Height/Weight/BMI


Height: 5'5"


Weight: 140lbs. 0.0oz. [id number]; 23.00 BMI


Method:Stated





Progress/Results/Core Measures


Results/Orders


My Orders





Orders - AGNIESZKA GUEVARA DO


Acute Abd Series (8/17/23 23:52)


Bisacodyl Suppository (Bisacodyl Supposi (8/18/23 01:00)


Lidocaine 2% (Urojet) (Lidocaine 2% (Uro (8/18/23 01:00)


Mineral Oil Retention Enema (Mineral Oil (8/18/23 01:00)


Milk Of Magnesia Oral Susp (Milk Of Magn (8/18/23 01:00)


Na Phos/Na Biphos Adult Enema (Na Phos/N (8/18/23 01:30)





Vital Signs/I&O











 8/17/23





 23:50


 


Temp 37.0


 


Pulse 97


 


Resp 20


 


B/P (MAP) 172/95 (120)


 


O2 Delivery Room Air








2





Blood Pressure Mean:                    120











Progress


Progress Note :  


Progress Note








PT WANTS TO GO HOME





Departure


Impression





   Primary Impression:  


   Constipation


Disposition:  01 HOME, SELF-CARE


Condition:  Stable





Departure-Patient Inst.


Decision time for Depature:  01:29


Referrals:  


[facility] (PCP/Family)


Primary Care Physician


Patient Instructions:  Constipation, Adult (DC)





Add. Discharge Instructions:  


TAKE MIRALAX 1 CAPFUL IN 8 OZ OF WATER OR GATORADE EVERY 30 MINUTES UNTIL YOUR 

BOWELS HAVE EMPTIED, THEN START TAKING IT ONCE A DAY EVERY DAY





DRINK EQUAL AMOUNTS OF WATER AND GATORADE--DRINK ENOUGH SO YOU ARE URINATING 

EVERY 2-3 HOURS WHILE AWAKE





DULCOLAX SUPPOSITORIES AS NEEDED FOR BOWEL MOVEMENT





FLEET'S ENEMAS UNTIL YOU HAVE A BM





FOLLOW UP WITH YOUR DR IF NO IMPROVEMENT





All discharge instructions reviewed with patient and/or family. Voiced 

understanding.











AGNIESZKA GUEVARA DO                 Aug 18, 2023 01:32